# Patient Record
Sex: FEMALE | Race: WHITE | HISPANIC OR LATINO | Employment: UNEMPLOYED | URBAN - METROPOLITAN AREA
[De-identification: names, ages, dates, MRNs, and addresses within clinical notes are randomized per-mention and may not be internally consistent; named-entity substitution may affect disease eponyms.]

---

## 2017-05-11 ENCOUNTER — HOSPITAL ENCOUNTER (EMERGENCY)
Facility: HOSPITAL | Age: 52
Discharge: HOME | End: 2017-05-11
Attending: EMERGENCY MEDICINE
Payer: MEDICAID

## 2017-05-11 VITALS
WEIGHT: 250 LBS | RESPIRATION RATE: 18 BRPM | TEMPERATURE: 99 F | SYSTOLIC BLOOD PRESSURE: 137 MMHG | OXYGEN SATURATION: 99 % | DIASTOLIC BLOOD PRESSURE: 77 MMHG | HEART RATE: 89 BPM | BODY MASS INDEX: 42.68 KG/M2 | HEIGHT: 64 IN

## 2017-05-11 DIAGNOSIS — M79.89 LEG SWELLING: Primary | ICD-10-CM

## 2017-05-11 DIAGNOSIS — M79.606 LEG PAIN: ICD-10-CM

## 2017-05-11 LAB — POCT GLUCOSE: 196 MG/DL (ref 70–110)

## 2017-05-11 PROCEDURE — 29515 APPLICATION SHORT LEG SPLINT: CPT

## 2017-05-11 PROCEDURE — 99284 EMERGENCY DEPT VISIT MOD MDM: CPT

## 2017-05-11 PROCEDURE — 82962 GLUCOSE BLOOD TEST: CPT

## 2017-05-11 RX ORDER — LISINOPRIL 10 MG/1
10 TABLET ORAL DAILY
Qty: 30 TABLET | Refills: 0 | Status: SHIPPED | OUTPATIENT
Start: 2017-05-11 | End: 2018-05-11

## 2017-05-11 RX ORDER — IBUPROFEN 800 MG/1
800 TABLET ORAL 3 TIMES DAILY
Qty: 21 TABLET | Refills: 0 | Status: SHIPPED | OUTPATIENT
Start: 2017-05-11 | End: 2017-05-21

## 2017-05-11 NOTE — ED PROVIDER NOTES
Chief complaint:  Leg Swelling (x1 week; left leg; warmth and swelling noted. denies shortness of breath or chest pain; ) and Medication Refill (needs refill of lisinopril 10mg; has been out x1 week)      HPI:  Patricia Blake is a 51 y.o. female presenting with acute onset of left leg swelling that has been going on for about a week.  She states that a week ago she went to the gym and did some workout but did not feel any specific pain or injury at that time.  No fevers or chills.  She is complaining of pain to her left calf region and swelling to her left foot.  She describes it as an aching cramping discomfort in her left leg.  No chest pain or shortness of breath.  No history of blood clots.    ROS: As per HPI and below:  Constitutional:  No fevers, no chills  Eyes: no visual changes  Cardiac: no chest pain  Respiratory: no shortness of breath  Abdominal: no abdominal pain, no nausea, no vomiting  Genitourinary: No dysuria, no frequency  Skin: no rash  Heme: no bleeding  Musculoskeletal:  left leg pain and swelling  Neuro: no focal numbness, no focal weakness  Pyschological: no depression      Review of patient's allergies indicates:  No Known Allergies    No current facility-administered medications on file prior to encounter.      Current Outpatient Prescriptions on File Prior to Encounter   Medication Sig Dispense Refill    metformin (GLUCOPHAGE) 500 MG tablet Take 1 tablet (500 mg total) by mouth 2 (two) times daily with meals. 180 tablet 1    metformin (GLUCOPHAGE) 500 MG tablet Take 1 tablet (500 mg total) by mouth 2 (two) times daily with meals. 30 tablet 3    naproxen (NAPROSYN) 500 MG tablet Take 1 tablet (500 mg total) by mouth 2 (two) times daily. 30 tablet 0    zolpidem (AMBIEN) 5 MG Tab take 1 on the night of the study; if you still can't sleep for another 60-90 min after the pill - take another pill. 2 tablet 0    [DISCONTINUED] lisinopril 10 MG tablet Take 1 tablet (10 mg total) by mouth  once daily. 90 tablet 1       PMH:  As per HPI and below:  Past Medical History:   Diagnosis Date    Diabetes     Hypertension      Past Surgical History:   Procedure Laterality Date     SECTION         Social History     Social History    Marital status:      Spouse name: N/A    Number of children: N/A    Years of education: N/A     Social History Main Topics    Smoking status: Never Smoker    Smokeless tobacco: None    Alcohol use No    Drug use: No    Sexual activity: Yes     Partners: Male     Birth control/ protection: None     Other Topics Concern    None     Social History Narrative    ** Merged History Encounter **            History reviewed. No pertinent family history.    Physical Exam:    Vitals:    17   BP: 137/77   Pulse: 89   Resp:    Temp:      Constitutional: Well-nourished, well-developed, in no acute distress, not cachectic  Eyes: PERRLA, EOMI, normal conjunctiva, normal sclera  ENT: Moist Mucous membranes  Respiratory: Clear to auscultation bilaterally, no wheezes, no crackles, no rhonchi  Cardiovascular: Regular rate and rhythm, no murmurs, no rubs, no gallops  Abdominal: Soft, nontender, nondistended, no guarding, no rebound  Musculoskeletal:  normal capillary refill, head atraumatic, neck supple, no meningismus, left lower extremity with 2+ edema, left calf tenderness, 2/2 bilateral pulses  Skin: no rash, no ecchymosis, no errythema, no discharge  Neurologic: Cranial nerves II through XII intact, no motor deficits, no sensory deficits, no cerebellar deficits  Psychological: Alert, oriented x3, normal affect, normal mood    Orders Placed This Encounter   Procedures    X-Ray Tibia Fibula 2 View Left    US Lower Extremity Veins Left    Crutch Training    Apply walking boot       Medications - No data to display      Labs Reviewed   POCT GLUCOSE - Abnormal; Notable for the following:        Result Value    POCT Glucose 196 (*)     All other components  within normal limits                   ASSESSMENT  1. Leg swelling    2. Leg pain          Disposition:  Discharge    Discharge Medication List as of 5/11/2017  7:46 PM      START taking these medications    Details   ibuprofen (ADVIL,MOTRIN) 800 MG tablet Take 1 tablet (800 mg total) by mouth 3 (three) times daily., Starting 5/11/2017, Until Sun 5/21/17, Print           Discharge Medication List as of 5/11/2017  7:46 PM      CONTINUE these medications which have CHANGED    Details   lisinopril 10 MG tablet Take 1 tablet (10 mg total) by mouth once daily., Starting 5/11/2017, Until Fri 5/11/18, Print           Discharge Medication List as of 5/11/2017  7:46 PM        Splint Application  Date/Time: 5/11/2017 8:59 PM  Performed by: CR ROSARIO III  Authorized by: CR ROSARIO III   Location details: left leg  Splint type: short leg  Supplies used: walking boot.  Post-procedure: The splinted body part was neurovascularly unchanged following the procedure.  Patient tolerance: Patient tolerated the procedure well with no immediate complications            MDM  Number of Diagnoses or Management Options  Leg pain:   Leg swelling:   Diagnosis management comments: Differential diagnosis includes DVT, leg injury, tib-fib fracture, gastrocnemius tear    Patient presents with about a week's worth of left leg pain and swelling.  She states it happened after she did some exercising however there was no acute pain or specific inciting events.  We'll obtain x-ray and ultrasound as my suspicion is this may be a DVT.    Patient with left lower external swelling of unclear etiology.  Possible injury to gastrocnemius.  No evidence of cellulitis or infection at this time.  Ultrasound shows no DVT.  X-ray shows no fracture.  We'll place an walking boot and crutches and have follow-up with orthopedics as an outpatient.       Amount and/or Complexity of Data Reviewed  Tests in the radiology section of CPT®: ordered  Discussion of test  results with the performing providers: yes (Lower extremity ultrasound: No DVT)  Decide to obtain previous medical records or to obtain history from someone other than the patient: yes  Independent visualization of images, tracings, or specimens: yes (Tib-fib x-ray: No acute process    Lower extremity ultrasound: No DVT)         Armand Rogel III, MD  05/11/17 2058       Armand Rogel III, MD  05/11/17 2059

## 2017-05-11 NOTE — ED NOTES
Pt presents to ed with c/o left swelling x1 week, following exercise. No chest pain, no shortness of breath.

## 2017-05-11 NOTE — ED AVS SNAPSHOT
[facility]  [address]  [place] [address]               Patricia Blake   2017  6:33 PM   ED    Descripción:  Female : 1965   Departamento:  [facility]           Aldridge Cuidado fue coordinado por:     Provider Role From To    Armand Pebbles ROSA MD Attending Provider 17 1834 --      Razón de la paulette     Leg Swelling     Medication Refill           Diagnósticos de Esta Visita        Comentarios    Leg swelling    -  Primario     Leg pain           ED Disposition     Ninguna           Lista de tareas           Información de seguimiento     Realice un seguimiento con:  Miller Jha MD    Cómo:  Lilli raj paulette lo antes posible    Cuándo:  2017    Especialidad:  Orthopedic Surgery    Información de contacto:    [address]  [address]  [place] [address]  [phone]        Recetas para recoger        Disp Refills Start End    lisinopril 10 MG tablet 30 tablet 0 2017    Take 1 tablet (10 mg total) by mouth once daily. - Oral    [facility]: [address], [place] [address] No. de tlfo: #: [phone]       ibuprofen (ADVIL,MOTRIN) 800 MG tablet 21 tablet 0 2017    Take 1 tablet (800 mg total) by mouth 3 (three) times daily. - Oral    [facility]: [address], [place] [address] [facility] [address] [facility] No. de tlfo: #: [phone]         [facility]     [facility] En Llamada Línea de Enfermeras - Asistencia   Enfermeras registradas de [facility] pueden ayudarle a reservar raj paulette, proveer educación para la bob, asesoría clínica, y otros servicios de asesoramiento.   Llame para yudi servicio gratuito a [phone].             Medicamentos           Mensaje sobre Medicamentos     Verificar los cambios y / o adiciones a aldridge régimen de medicación son los mismos que discutir con aldridge médico. Si cualquiera de estos cambios o adiciones son incorrectos, por favor notifique a aldridge proveedor de  "atención médica.        EMPEZAR a sandra estos medicamentos NUEVOS        Refills    lisinopril 10 MG tablet 0    Sig: Take 1 tablet (10 mg total) by mouth once daily.    Categoría: Print    Vía: Oral    ibuprofen (ADVIL,MOTRIN) 800 MG tablet 0    Sig: Take 1 tablet (800 mg total) by mouth 3 (three) times daily.    Categoría: Print    Vía: Oral           Verifique que la siguiente lista de medicamentos es raj representación exacta de los medicamentos que está tomando actualmente. Si no hay ningunos reportados, la lista puede estar en layton. Si no es correcta, por favor póngase en contacto con montalvo proveedor de atención médica. Lleve esta lista con usted en mireya de emergencia.           Medicamentos Actuales     ibuprofen (ADVIL,MOTRIN) 800 MG tablet Take 1 tablet (800 mg total) by mouth 3 (three) times daily.    lisinopril 10 MG tablet Take 1 tablet (10 mg total) by mouth once daily.    metformin (GLUCOPHAGE) 500 MG tablet Take 1 tablet (500 mg total) by mouth 2 (two) times daily with meals.    metformin (GLUCOPHAGE) 500 MG tablet Take 1 tablet (500 mg total) by mouth 2 (two) times daily with meals.    naproxen (NAPROSYN) 500 MG tablet Take 1 tablet (500 mg total) by mouth 2 (two) times daily.    zolpidem (AMBIEN) 5 MG Tab take 1 on the night of the study; if you still can't sleep for another 60-90 min after the pill - take another pill.           Información de referencia clínica           Alva signos vitales toy     PS Pulso Temperatura Resp [place] Peso    180/89 82 98.7 °F (37.1 °C) (Oral) 18 5' 4" (1.626 m) 113.4 kg (250 lb)    SpO2 BMI (IMC)                98% 42.91 kg/m2          Alergias     A partir del:  5/11/2017        No Known Allergies      Vacunas     Administradas en la fecha de la visita:  5/11/2017        None      ED Micro, Lab, POCT     Start Ordered       Status Ordering Provider    05/11/17 1842 05/11/17 1842  POCT glucose  Once      Final result       ED Imaging Orders     Start Ordered       " Status Ordering Provider    05/11/17 1859 05/11/17 1845  US Lower Extremity Veins Left  1 time imaging      Final result     05/11/17 1846 05/11/17 1845  X-Ray Tibia Fibula 2 View Left  1 time imaging      Final result     05/11/17 1846 05/11/17 1845    1 time imaging,   Status:  Canceled      Canceled         Instrucciones a scotty de celso         Hinchazón De Pierna [Leg Swelling, Unilateral]  La hinchazón de los brazos, los pies, los tobillos y la piernas se llama edema. Se debe al exceso de líquido almacenado en los tejidos. Por la gravedad, el líquido excedente en el cuerpo se establece en la parte inferior. Brendan es la razón por la que los pies y las piernas resultan más afectados.  Algunas de las causas de la hinchazón en raj de las piernas incluyen:  · Infección en el pie o la pierna  · Insuficiencia venosa (congestión de jimbo en las venas de las piernas)  · Venas varicosas (venas dilatadas en la parte inferior de la pierna)  · Ligas o cualquier cosa que apriete la pierna  · Mordedura o picadura de un insecto en el pie o la pierna  · Lesión o cirugía reciente en el pie o la pierna  · Coágulo de jimbo en las venas profundas de la pierna  El tratamiento médico dependerá de la causa de montalvo hinchazón.  Cuidado En [place]:  1. No use vestimenta apretada en won piernas (burak ligas o calcetines o calzas apretados)  2. Eleve montalvo pierna hinchada mientras esté acostado o sentado.  3. Sotero todos los medicamentos según las indicaciones.  4. Si la causa de montalvo hinchazón es raj infección, lesión o cirugía limite el uso de brendan parte hasta que mejoren los síntomas.  5. Si montalvo médico dice que la causa de montalvo hinchazón de pierna es la insuficiencia venosa o várices, no se siente o pare en un lugar por períodos largos de tiempo. Tómese recreos y alva caminatas cada pocas horas. La caminata vigorosa es un buen ejercicio y ayuda a circular la jimbo congestionada en montalvo pierna. Hable con montalvo médico acerca del uso de calzas de sostén para  prevenir la hinchazón diurna de la pierna.  Seguimiento  con montalvo médico o de acuerdo con lo recomendado por nuestro personal.  Busque Prontamente Atención Médica  si algo de lo siguiente ocurre:  · Falta de aire o dolor de pecho al respirar  · Tose jimbo  · Aumento de la hinchazón, la temperatura o el enrojecimiento de won piernas, tobillos o pies  · Dolos en las pantorrillas  · Fiebre de 100.4°F (38°C) o más celso, o burak le haya indicado montalvo proveedor de atención médica  · Debilidad, mareos o desmayos  Date Last Reviewed: 4/11/3026  © 9677-1287 [employer]. [address] [place] [address]. Todos los derechos reservados. Esta información no pretende sustituir la atención médica profesional. Sólo montalvo médico puede diagnosticar y tratar un problema de bob.          Registrarse para [facility]     La activación de montalvo cuenta [facility] es tan fácil burak 1-2-3!    1) Ir a [email or web address], seleccione Registrarse Ahora, meter el código de activación y montalvo fecha de nacimiento, y seleccione Próximo.    [id number]  Expires: 6/25/2017  7:46 PM      2) Crear un nombre de usuario y contraseña para usar cuando se visita [facility] en el futuro y selecciona raj pregunta de seguridad en mireya de que pierda montalvo contraseña y seleccione Próximo.    3) Introduzca montalvo dirección de correo electrónico y alva [facility] en Registrarse!    Información Adicional  Si tiene alguna pregunta, por favor, e-mail [email or web address] o llame al [phone] para hablar con nuestro personal. Recuerde, [facility] no debe ser usada para necesidades urgentes. En mireya de emergencia médica, llame al 911.         [facility] cumple con las leyes federales aplicables de derechos civiles y no discrimina por motivos de chava, color, origen nacional, edad, discapacidad, o sexo.        Language Assistance Services     ATTENTION: Language assistance services are available, free of charge. Please call [phone].       ATENCIÓN: Si habjoel wilson, tiene a montalvo disposición servicios gratuitos de asistencia lingüística. Shahab borges [phone].     [facility] Ý: N?u b?n nói Ti?ng Vi?t, có các d?ch v? h? tr? ngôn ng? mi?n phí dành cho b?n. G?i s? [phone].                      [facility]  [address]  [place] [address]               Patricia Blake   2017  6:33 PM   ED    Description:  Female : 1965   Department:  [facility]           Your Care was Coordinated By:     Provider Role From To    Armand Rogel III, MD Attending Provider 17 7486 --      Reason for Visit     Leg Swelling     Medication Refill           Diagnoses this Visit        Comments    Leg swelling    -  Primary     Leg pain           ED Disposition     None           To Do List           Follow-up Information     Follow up with Miller Jha MD. Schedule an appointment as soon as possible for a visit in 3 days.    Specialty:  Orthopedic Surgery    Contact information:    [address]  [address]  [facility] [address]  [phone]         These Medications        Disp Refills Start End    lisinopril 10 MG tablet 30 tablet 0 2017    Take 1 tablet (10 mg total) by mouth once daily. - Oral    Pharmacy: [facility] [address] [place] [address] Ph #: [phone]       ibuprofen (ADVIL,MOTRIN) 800 MG tablet 21 tablet 0 2017    Take 1 tablet (800 mg total) by mouth 3 (three) times daily. - Oral    Pharmacy: [facility] [address] [place] [address] Ph #: [phone]         [facility]     [facility] On [facility] Care Line -  Assistance  Unless otherwise directed by your provider, please contact [facility], our nurse care line that is available for  assistance.     Registered nurses in the [facility] Center provide: appointment scheduling, clinical advisement, health education, and other advisory services.  Call:  "[phone] (toll free)               Medications           Message regarding Medications     Verify the changes and/or additions to your medication regime listed below are the same as discussed with your clinician today.  If any of these changes or additions are incorrect, please notify your healthcare provider.        START taking these NEW medications        Refills    lisinopril 10 MG tablet 0    Sig: Take 1 tablet (10 mg total) by mouth once daily.    Class: Print    Route: Oral    ibuprofen (ADVIL,MOTRIN) 800 MG tablet 0    Sig: Take 1 tablet (800 mg total) by mouth 3 (three) times daily.    Class: Print    Route: Oral           Verify that the below list of medications is an accurate representation of the medications you are currently taking.  If none reported, the list may be blank. If incorrect, please contact your healthcare provider. Carry this list with you in case of emergency.           Current Medications     ibuprofen (ADVIL,MOTRIN) 800 MG tablet Take 1 tablet (800 mg total) by mouth 3 (three) times daily.    lisinopril 10 MG tablet Take 1 tablet (10 mg total) by mouth once daily.    metformin (GLUCOPHAGE) 500 MG tablet Take 1 tablet (500 mg total) by mouth 2 (two) times daily with meals.    metformin (GLUCOPHAGE) 500 MG tablet Take 1 tablet (500 mg total) by mouth 2 (two) times daily with meals.    naproxen (NAPROSYN) 500 MG tablet Take 1 tablet (500 mg total) by mouth 2 (two) times daily.    zolpidem (AMBIEN) 5 MG Tab take 1 on the night of the study; if you still can't sleep for another 60-90 min after the pill - take another pill.           Clinical Reference Information           Your Vitals Were     BP Pulse Temp Resp Height Weight    180/89 82 98.7 °F (37.1 °C) (Oral) 18 5' 4" (1.626 m) 113.4 kg (250 lb)    SpO2 BMI                98% 42.91 kg/m2          Allergies as of 5/11/2017     No Known Allergies      Immunizations Administered on Date of Encounter - 5/11/2017     None      ED Micro, " Lab, POCT     Start Ordered       Status Ordering Provider    05/11/17 1842 05/11/17 1842  POCT glucose  Once      Final result       ED Imaging Orders     Start Ordered       Status Ordering Provider    05/11/17 1859 05/11/17 1845  US Lower Extremity Veins Left  1 time imaging      Final result     05/11/17 1846 05/11/17 1845  X-Ray Tibia Fibula 2 View Left  1 time imaging      Final result     05/11/17 1846 05/11/17 1845    1 time imaging,   Status:  Canceled      Canceled         Discharge Instructions         Hinchazón De Pierna [Leg Swelling, Unilateral]  La hinchazón de los brazos, los pies, los tobillos y la piernas se llama edema. Se debe al exceso de líquido almacenado en los tejidos. Por la gravedad, el líquido excedente en el cuerpo se establece en la parte inferior. Brendan es la razón por la que los pies y las piernas resultan más afectados.  Algunas de las causas de la hinchazón en raj de las piernas incluyen:  · Infección en el pie o la pierna  · Insuficiencia venosa (congestión de jimbo en las venas de las piernas)  · Venas varicosas (venas dilatadas en la parte inferior de la pierna)  · Ligas o cualquier cosa que apriete la pierna  · Mordedura o picadura de un insecto en el pie o la pierna  · Lesión o cirugía reciente en el pie o la pierna  · Coágulo de jimbo en las venas profundas de la pierna  El tratamiento médico dependerá de la causa de montalvo hinchazón.  Cuidado En [place]:  1. No use vestimenta apretada en won piernas (burak ligas o calcetines o calzas apretados)  2. Eleve montalvo pierna hinchada mientras esté acostado o sentado.  3. Sotero todos los medicamentos según las indicaciones.  4. Si la causa de montalvo hinchazón es raj infección, lesión o cirugía limite el uso de brendan parte hasta que mejoren los síntomas.  5. Si montalvo médico dice que la causa de montalvo hinchazón de pierna es la insuficiencia venosa o várices, no se siente o pare en un lugar por períodos largos de tiempo. Tómese recreos y alva caminatas cada  pocas horas. La caminata vigorosa es un buen ejercicio y ayuda a circular la jimbo congestionada en montalvo pierna. Hable con montalvo médico acerca del uso de calzas de sostén para prevenir la hinchazón diurna de la pierna.  Seguimiento  con montalvo médico o de acuerdo con lo recomendado por nuestro personal.  Busque Prontamente Atención Médica  si algo de lo siguiente ocurre:  · Falta de aire o dolor de pecho al respirar  · Tose jimbo  · Aumento de la hinchazón, la temperatura o el enrojecimiento de won piernas, tobillos o pies  · Dolos en las pantorrillas  · Fiebre de 100.4°F (38°C) o más celso, o burak le haya indicado montalvo proveedor de atención médica  · Debilidad, mareos o desmayos  Date Last Reviewed: 4/11/3026  © 7032-9104 [employer]. [address] [place] [address]. Todos los derechos reservados. Esta información no pretende sustituir la atención médica profesional. Sólo montalvo médico puede diagnosticar y tratar un problema de bob.          [facility] Sign-Up     Activating your [facility] account is as easy as 1-2-3!     1) Visit [email or web address], select Sign Up Now, enter this activation code and your date of birth, then select Next.  [email or web address]  Expires: 6/25/2017  7:46 PM      2) Create a username and password to use when you visit [facility] in the future and select a security question in case you lose your password and select Next.    3) Enter your e-mail address and click Sign Up!    Additional Information  If you have questions, please e-mail [email or web address] or call [phone] to talk to our [facility] staff. Remember, [facility] is NOT to be used for urgent needs. For medical emergencies, dial 911.          [facility] complies with applicable Federal civil rights laws and does not discriminate on the basis of race, color, national origin, age, disability, or sex.        Language Assistance Services     ATTENTION: Language assistance services are available, free of  charge. Please call [phone].      ATENCIÓN: Si habla español, tiene a montalvo disposición servicios gratuitos de asistencia lingüística. Llame al [phone].     CHÚ Ý: N?u b?n nói Ti?ng Vi?t, có các d?ch v? h? tr? ngôn ng? mi?n phí dành cho b?n. G?i s? [phone].

## 2017-05-12 NOTE — DISCHARGE INSTRUCTIONS
Hinchazón De Pierna [Leg Swelling, Unilateral]  La hinchazón de los brazos, los pies, los tobillos y la piernas se llama edema. Se debe al exceso de líquido almacenado en los tejidos. Por la gravedad, el líquido excedente en el cuerpo se establece en la parte inferior. Brendan es la razón por la que los pies y las piernas resultan más afectados.  Algunas de las causas de la hinchazón en raj de las piernas incluyen:  · Infección en el pie o la pierna  · Insuficiencia venosa (congestión de jimbo en las venas de las piernas)  · Venas varicosas (venas dilatadas en la parte inferior de la pierna)  · Ligas o cualquier cosa que apriete la pierna  · Mordedura o picadura de un insecto en el pie o la pierna  · Lesión o cirugía reciente en el pie o la pierna  · Coágulo de jimbo en las venas profundas de la pierna  El tratamiento médico dependerá de la causa de montalvo hinchazón.  [place]:  1. No use vestimenta apretada en won piernas (burak ligas o calcetines o calzas apretados)  2. Eleve montalvo pierna hinchada mientras esté acostado o sentado.  3. Sotero todos los medicamentos según las indicaciones.  4. Si la causa de montalvo hinchazón es raj infección, lesión o cirugía limite el uso de brendan parte hasta que mejoren los síntomas.  5. Si montalvo médico dice que la causa de montalvo hinchazón de pierna es la insuficiencia venosa o várices, no se siente o pare en un lugar por períodos largos de tiempo. Tómese recreos y alva caminatas cada pocas horas. La caminata vigorosa es un buen ejercicio y ayuda a circular la jimbo congestionada en montalvo pierna. Hable con montalvo médico acerca del uso de calzas de sostén para prevenir la hinchazón diurna de la pierna.  Seguimiento  con montalvo médico o de acuerdo con lo recomendado por nuestro personal.  Busque Prontamente Atención Médica  si algo de lo siguiente ocurre:  · Falta de aire o dolor de pecho al respirar  · Tose jimbo  · Aumento de la hinchazón, la temperatura o el enrojecimiento de won piernas, tobillos o  pies  · Dolos en las pantorrillas  · Fiebre de 100.4°F (38°C) o más celso, o burak le haya indicado montalvo proveedor de atención médica  · Debilidad, mareos o desmayos  Date Last Reviewed: 4/11/3026  © 3838-1330 [employer]. [address] [place] [id number]. Todos los derechos reservados. Esta información no pretende sustituir la atención médica profesional. Sólo montalvo médico puede diagnosticar y tratar un problema de bob.

## 2022-03-17 ENCOUNTER — TELEPHONE (OUTPATIENT)
Dept: HEMATOLOGY/ONCOLOGY | Facility: CLINIC | Age: 57
End: 2022-03-17
Payer: MEDICAID

## 2025-01-16 DIAGNOSIS — Z12.4 PAPANICOLAOU SMEAR: Primary | ICD-10-CM
